# Patient Record
Sex: MALE | Race: WHITE | NOT HISPANIC OR LATINO | URBAN - METROPOLITAN AREA
[De-identification: names, ages, dates, MRNs, and addresses within clinical notes are randomized per-mention and may not be internally consistent; named-entity substitution may affect disease eponyms.]

---

## 2023-01-30 ENCOUNTER — OFFICE (OUTPATIENT)
Dept: URBAN - METROPOLITAN AREA PATHOLOGY 2 | Facility: PATHOLOGY | Age: 73
End: 2023-01-30
Payer: MEDICARE

## 2023-01-30 ENCOUNTER — AMBULATORY SURGICAL CENTER (OUTPATIENT)
Dept: URBAN - METROPOLITAN AREA SURGERY 12 | Facility: SURGERY | Age: 73
End: 2023-01-30
Payer: MEDICARE

## 2023-01-30 VITALS
RESPIRATION RATE: 13 BRPM | OXYGEN SATURATION: 98 % | SYSTOLIC BLOOD PRESSURE: 143 MMHG | HEART RATE: 87 BPM | SYSTOLIC BLOOD PRESSURE: 139 MMHG | HEART RATE: 87 BPM | OXYGEN SATURATION: 100 % | DIASTOLIC BLOOD PRESSURE: 93 MMHG | HEART RATE: 72 BPM | OXYGEN SATURATION: 97 % | DIASTOLIC BLOOD PRESSURE: 86 MMHG | HEART RATE: 74 BPM | RESPIRATION RATE: 13 BRPM | RESPIRATION RATE: 10 BRPM | OXYGEN SATURATION: 99 % | RESPIRATION RATE: 14 BRPM | WEIGHT: 195 LBS | DIASTOLIC BLOOD PRESSURE: 90 MMHG | DIASTOLIC BLOOD PRESSURE: 83 MMHG | SYSTOLIC BLOOD PRESSURE: 152 MMHG | OXYGEN SATURATION: 97 % | SYSTOLIC BLOOD PRESSURE: 143 MMHG | RESPIRATION RATE: 12 BRPM | HEIGHT: 70 IN | HEIGHT: 70 IN | RESPIRATION RATE: 18 BRPM | HEART RATE: 90 BPM | DIASTOLIC BLOOD PRESSURE: 93 MMHG | DIASTOLIC BLOOD PRESSURE: 87 MMHG | RESPIRATION RATE: 16 BRPM | RESPIRATION RATE: 18 BRPM | DIASTOLIC BLOOD PRESSURE: 97 MMHG | OXYGEN SATURATION: 96 % | RESPIRATION RATE: 14 BRPM | HEART RATE: 72 BPM | HEART RATE: 79 BPM | WEIGHT: 195 LBS | DIASTOLIC BLOOD PRESSURE: 86 MMHG | SYSTOLIC BLOOD PRESSURE: 134 MMHG | DIASTOLIC BLOOD PRESSURE: 97 MMHG | OXYGEN SATURATION: 98 % | HEART RATE: 123 BPM | HEART RATE: 73 BPM | DIASTOLIC BLOOD PRESSURE: 87 MMHG | HEART RATE: 90 BPM | OXYGEN SATURATION: 99 % | TEMPERATURE: 97.8 F | SYSTOLIC BLOOD PRESSURE: 134 MMHG | RESPIRATION RATE: 10 BRPM | HEART RATE: 123 BPM | DIASTOLIC BLOOD PRESSURE: 90 MMHG | DIASTOLIC BLOOD PRESSURE: 83 MMHG | HEART RATE: 73 BPM | HEART RATE: 79 BPM | RESPIRATION RATE: 17 BRPM | DIASTOLIC BLOOD PRESSURE: 92 MMHG | SYSTOLIC BLOOD PRESSURE: 152 MMHG | SYSTOLIC BLOOD PRESSURE: 144 MMHG | SYSTOLIC BLOOD PRESSURE: 139 MMHG | SYSTOLIC BLOOD PRESSURE: 155 MMHG | SYSTOLIC BLOOD PRESSURE: 142 MMHG | DIASTOLIC BLOOD PRESSURE: 92 MMHG | SYSTOLIC BLOOD PRESSURE: 144 MMHG | OXYGEN SATURATION: 100 % | SYSTOLIC BLOOD PRESSURE: 142 MMHG | OXYGEN SATURATION: 96 % | TEMPERATURE: 97.8 F | DIASTOLIC BLOOD PRESSURE: 91 MMHG | DIASTOLIC BLOOD PRESSURE: 91 MMHG | SYSTOLIC BLOOD PRESSURE: 155 MMHG | RESPIRATION RATE: 17 BRPM | RESPIRATION RATE: 16 BRPM | HEART RATE: 74 BPM | RESPIRATION RATE: 12 BRPM

## 2023-01-30 DIAGNOSIS — K22.70 BARRETT'S ESOPHAGUS WITHOUT DYSPLASIA: ICD-10-CM

## 2023-01-30 DIAGNOSIS — K22.89 OTHER SPECIFIED DISEASE OF ESOPHAGUS: ICD-10-CM

## 2023-01-30 DIAGNOSIS — R13.10 DYSPHAGIA, UNSPECIFIED: ICD-10-CM

## 2023-01-30 DIAGNOSIS — K31.7 POLYP OF STOMACH AND DUODENUM: ICD-10-CM

## 2023-01-30 DIAGNOSIS — K29.50 UNSPECIFIED CHRONIC GASTRITIS WITHOUT BLEEDING: ICD-10-CM

## 2023-01-30 PROCEDURE — 43250 EGD CAUTERY TUMOR POLYP: CPT | Performed by: INTERNAL MEDICINE

## 2023-01-30 PROCEDURE — 43239 EGD BIOPSY SINGLE/MULTIPLE: CPT | Mod: 59 | Performed by: INTERNAL MEDICINE

## 2023-01-30 PROCEDURE — 88305 TISSUE EXAM BY PATHOLOGIST: CPT | Performed by: PATHOLOGY

## 2023-01-30 PROCEDURE — 88342 IMHCHEM/IMCYTCHM 1ST ANTB: CPT | Performed by: PATHOLOGY

## 2023-01-30 PROCEDURE — 88313 SPECIAL STAINS GROUP 2: CPT | Performed by: PATHOLOGY

## 2023-04-29 LAB — SARS-COV-2 RESULT: NOT DETECTED

## 2023-05-01 ENCOUNTER — HOSPITAL ENCOUNTER (OUTPATIENT)
Dept: DATA CONVERSION | Facility: HOSPITAL | Age: 73
End: 2023-05-02
Attending: OTOLARYNGOLOGY | Admitting: OTOLARYNGOLOGY
Payer: MEDICARE

## 2023-05-01 DIAGNOSIS — R13.10 DYSPHAGIA, UNSPECIFIED: ICD-10-CM

## 2023-05-01 DIAGNOSIS — Z79.82 LONG TERM (CURRENT) USE OF ASPIRIN: ICD-10-CM

## 2023-05-01 DIAGNOSIS — K22.5 DIVERTICULUM OF ESOPHAGUS, ACQUIRED: ICD-10-CM

## 2023-05-01 DIAGNOSIS — I25.10 ATHEROSCLEROTIC HEART DISEASE OF NATIVE CORONARY ARTERY WITHOUT ANGINA PECTORIS: ICD-10-CM

## 2023-05-01 DIAGNOSIS — Z86.73 PERSONAL HISTORY OF TRANSIENT ISCHEMIC ATTACK (TIA), AND CEREBRAL INFARCTION WITHOUT RESIDUAL DEFICITS: ICD-10-CM

## 2023-05-01 DIAGNOSIS — E78.5 HYPERLIPIDEMIA, UNSPECIFIED: ICD-10-CM

## 2023-05-01 DIAGNOSIS — Z88.0 ALLERGY STATUS TO PENICILLIN: ICD-10-CM

## 2023-05-01 DIAGNOSIS — Z95.5 PRESENCE OF CORONARY ANGIOPLASTY IMPLANT AND GRAFT: ICD-10-CM

## 2023-05-01 DIAGNOSIS — I10 ESSENTIAL (PRIMARY) HYPERTENSION: ICD-10-CM

## 2023-09-07 VITALS — BODY MASS INDEX: 28.53 KG/M2 | WEIGHT: 199.3 LBS | HEIGHT: 70 IN

## 2023-09-14 NOTE — DISCHARGE SUMMARY
Send Summary:   Discharge Summary Providers:  Provider Role Provider Name   · Attending Ciro Rossi   · Referring Ciro Rossi   · Primary Unknown, Pcp       Note Recipients: Ciro Rossi MD - [phone]  [Preferred]  Unknown, Pcp, MD       Discharge:    Summary:   Admission Date: .01-May-2023 10:14:00   Discharge Date: 02-May-2023   Attending Physician at Discharge: Ciro Rossi   Admission Reason: Zenker's diverticulum   Final Discharge Diagnoses: Zenker's diverticulum   Procedures: Date: 01-May-2023 13:24:00  Procedure Name: 1. direct laryngoscopy  2. open cricopharyngeal myotomy   Condition at Discharge: Satisfactory   Disposition at Discharge: .Home   Vital Signs:        T   P  R  BP   MAP  SpO2   Value  36  83  18  156/97      95%  Date/Time 5/2 6:11 5/2 6:11 5/2 6:11 5/2 6:11    5/2 6:11  Range  (36C - 36.3C )  (77 - 86 )  (17 - 18 )  (149 - 165 )/ (75 - 97 )    (93% - 95% )    Date:            Weight/Scale Type:  Height:   01-May-2023 20:06  90.4  kg / standing  177.8  cm  Physical Exam:    - General: lying comfortably in bed, conversational  - Head: normocephalic, atraumatic  - Eyes: PERRL, EOMI, sclera anicteric  - Ears: TM pearly white bilaterally  - Nose: no rhinorrhea appreciated, no septal deviation, healthy appearing mucosa  - Mouth: oral mucosa moist, no oral lesions appreciated  - Neck: supple, thyroid not enlarged; incision well approximated, c/d/i  - Lymph: no pre-/post-auricular, submandibular, cervical, nor supraclavicular nodes appreciated  - CV:  regular rate  - Pulm: non-labored breathing on room air  - GI: BS+, soft, non-distended, non-tender to palpation  - Neuro: alert, cranial nerves intact II-XII grossly bilaterally  Hospital Course:    72 yr old male with zenkers diverticulum s/p direct laryngoscopy, open cricopharyngeal myotomy on 5/1/23 with Dr. Rossi. Please see operative report for full details.  Patient tolerated the procedure well and recovered briefly in PACU  before being transitioned to regular nursing floor. Post-op course was uncomplicated. Diet was advanced to soft as tolerated.  IV medication transitioned to oral as diet advanced. Drains  were removed when outputs were appropriately low. On the day of discharge, the pt was tolerating a diet, pain was controlled on PO pain medication, and they were ambulating and voiding spontaneously. They were discharged home in stable condition with  instructions to follow up as outpatient.    Active Issues:  -Dysphagia  Zenker's Diverticulum    Total time spent today was 25 minutes, more than half of my time was spent coordinating patients discharge.       Discharge Information:    and Continuing Care:   Lab Results - Pending:    None  Radiology Results - Pending: None   Discharge Instructions:    Activity:           activity as tolerated.          May shower..            May not return to school/work for 1 week(s).            May not drive while taking narcotics.            No pushing, pulling, or lifting objects greater than 10 pounds.            Weight-bearing Instructions: full weight bearing.      Nutrition/Diet:           regular          Diet Consistency/Texture:   soft    Follow Up Appointments:    Follow-Up Appointment 01:           Physician/Dept/Service:   Dr. Rossi          Reason for Referral:   Post op          Scheduled Date/Time:   17-May-2023 10:30          Location:   [place] [address].          Phone Number:   [phone] with questions    Discharge Medications: Home Medication   metoprolol succinate 25 mg oral tablet, extended release - 1 tab(s) orally once a day  pantoprazole 40 mg oral delayed release tablet - 1 tab(s) orally 2 times a day  magnesium oxide 400 mg oral tablet - 1 tab(s) orally once a day  traMADol 50 mg oral tablet - 1 tab(s) orally every 6 hours -.ADOD - .Patient Location - .Meds to Beds 5/2/23, [facility] 5  as needed for post  operative pain G89.18   Vitamin C 1000 mg oral tablet - 1 tab(s) oral once a day  aspirin 81 mg oral tablet - 1 tab(s) oral once a day  atorvastatin 80 mg oral tablet - 1 tab(s) oral once a day     PRN Medication     DNR Status:   ·  Code Status Code Status order at time of discharge: Full Code     Attestation:   Note Completion:  Provider/Team Pager # [phone]         Electronic Signatures:  Callie Garcia (APRN-CNP)  (Signed 02-May-2023 14:32)   Authored: Send Summary, Summary Content, Ongoing Care,  DNR Status, Note Completion      Last Updated: 02-May-2023 14:32 by Callie Garcia (APRN-CNP)

## 2023-10-02 NOTE — OP NOTE
PROCEDURE DETAILS    Preoperative Diagnosis:  dysphagia  zenkers diverticulum  Postoperative Diagnosis:  dysphagia  zenkers diverticulum  Surgeon: Enid  Resident/Fellow/Other Assistant: masood    Procedure:  1. direct laryngoscopy  2. open cricopharyngeal myotomy  Anesthesia: geta  Estimated Blood Loss: 2cc  Findings: 1. CP bar visualized on dedo, no debris in small pouch  Specimens(s) Collected: no,     Complications: none  Patient Returned To/Condition: pacu/stable                                Attestation:   Note Completion:  Attending Attestation I was present for the entire procedure    I am a: Resident/Fellow         Electronic Signatures:  Charlie Cardoza (Resident))  (Signed 01-May-2023 13:26)   Authored: Post-Operative Note, Chart Review, Note Completion  Ciro Rossi)  (Signed 01-May-2023 17:34)   Authored: Note Completion   Co-Signer: Post-Operative Note, Chart Review, Note Completion      Last Updated: 01-May-2023 17:34 by Ciro Rossi)

## 2023-11-14 NOTE — PROGRESS NOTES
Provider Impressions     Status post surgery for Zenker's diverticulum. The patient is very pleased with the results.  He was made aware of the possibility of a recurrence.  He is to get back to me if things get worse.    I will see him on a as needed basis.     Chief Complaint     Follow-up status post surgery for the management of a Zenker's diverticulum.      History of Present Illness    This patient was seen in February 2023 at the request of his gastroenterologist. He has had some issues with swallowing for the past 4 years or so. He feels that things get blocked in his throat. When he brings food back it tastes like normal food. There is no acid he taste. He has not lost weight. I did send him for an esophagogram which was done in March 2023. It did show a very small Zenker's diverticulum. On May 1, 2023 he underwent a cricopharyngeal myotomy through an external approach. He is here today for follow-up. He is very pleased. His swallowing is significantly improved.     Physical Exam    The neck examination shows the incision site to be well-healed.

## 2023-11-15 ENCOUNTER — OFFICE VISIT (OUTPATIENT)
Dept: OTOLARYNGOLOGY | Facility: CLINIC | Age: 73
End: 2023-11-15
Payer: MEDICARE

## 2023-11-15 VITALS — WEIGHT: 199.6 LBS | BODY MASS INDEX: 28.58 KG/M2 | HEIGHT: 70 IN | TEMPERATURE: 97.3 F

## 2023-11-15 DIAGNOSIS — K22.5 ZENKER'S DIVERTICULUM: Primary | ICD-10-CM

## 2023-11-15 PROCEDURE — 1159F MED LIST DOCD IN RCRD: CPT | Performed by: OTOLARYNGOLOGY

## 2023-11-15 PROCEDURE — 1036F TOBACCO NON-USER: CPT | Performed by: OTOLARYNGOLOGY

## 2023-11-15 PROCEDURE — 99213 OFFICE O/P EST LOW 20 MIN: CPT | Performed by: OTOLARYNGOLOGY

## 2023-11-15 PROCEDURE — 1160F RVW MEDS BY RX/DR IN RCRD: CPT | Performed by: OTOLARYNGOLOGY

## 2023-11-15 PROCEDURE — 3008F BODY MASS INDEX DOCD: CPT | Performed by: OTOLARYNGOLOGY

## 2023-11-15 ASSESSMENT — PATIENT HEALTH QUESTIONNAIRE - PHQ9
2. FEELING DOWN, DEPRESSED OR HOPELESS: NOT AT ALL
1. LITTLE INTEREST OR PLEASURE IN DOING THINGS: NOT AT ALL
SUM OF ALL RESPONSES TO PHQ9 QUESTIONS 1 AND 2: 0

## 2024-04-24 ENCOUNTER — OFFICE (OUTPATIENT)
Dept: URBAN - METROPOLITAN AREA CLINIC 27 | Facility: CLINIC | Age: 74
End: 2024-04-24
Payer: MEDICARE

## 2024-04-24 VITALS
HEIGHT: 70 IN | TEMPERATURE: 98.2 F | SYSTOLIC BLOOD PRESSURE: 137 MMHG | DIASTOLIC BLOOD PRESSURE: 85 MMHG | WEIGHT: 203 LBS | HEART RATE: 68 BPM

## 2024-04-24 DIAGNOSIS — K22.5 DIVERTICULUM OF ESOPHAGUS, ACQUIRED: ICD-10-CM

## 2024-04-24 DIAGNOSIS — K22.70 BARRETT'S ESOPHAGUS WITHOUT DYSPLASIA: ICD-10-CM

## 2024-04-24 DIAGNOSIS — R13.10 DYSPHAGIA, UNSPECIFIED: ICD-10-CM

## 2024-04-24 DIAGNOSIS — R19.4 CHANGE IN BOWEL HABIT: ICD-10-CM

## 2024-04-24 DIAGNOSIS — K21.9 GASTRO-ESOPHAGEAL REFLUX DISEASE WITHOUT ESOPHAGITIS: ICD-10-CM

## 2024-04-24 PROCEDURE — 99214 OFFICE O/P EST MOD 30 MIN: CPT

## 2024-04-26 VITALS
SYSTOLIC BLOOD PRESSURE: 122 MMHG | DIASTOLIC BLOOD PRESSURE: 99 MMHG | RESPIRATION RATE: 10 BRPM | OXYGEN SATURATION: 99 % | SYSTOLIC BLOOD PRESSURE: 118 MMHG | RESPIRATION RATE: 6 BRPM | DIASTOLIC BLOOD PRESSURE: 99 MMHG | DIASTOLIC BLOOD PRESSURE: 108 MMHG | DIASTOLIC BLOOD PRESSURE: 108 MMHG | DIASTOLIC BLOOD PRESSURE: 78 MMHG | RESPIRATION RATE: 12 BRPM | DIASTOLIC BLOOD PRESSURE: 77 MMHG | OXYGEN SATURATION: 99 % | DIASTOLIC BLOOD PRESSURE: 108 MMHG | SYSTOLIC BLOOD PRESSURE: 123 MMHG | RESPIRATION RATE: 20 BRPM | HEART RATE: 71 BPM | WEIGHT: 205 LBS | OXYGEN SATURATION: 95 % | RESPIRATION RATE: 15 BRPM | TEMPERATURE: 97.2 F | DIASTOLIC BLOOD PRESSURE: 79 MMHG | HEART RATE: 71 BPM | HEART RATE: 72 BPM | HEART RATE: 72 BPM | SYSTOLIC BLOOD PRESSURE: 118 MMHG | HEART RATE: 73 BPM | RESPIRATION RATE: 12 BRPM | SYSTOLIC BLOOD PRESSURE: 132 MMHG | DIASTOLIC BLOOD PRESSURE: 79 MMHG | TEMPERATURE: 97.2 F | DIASTOLIC BLOOD PRESSURE: 77 MMHG | DIASTOLIC BLOOD PRESSURE: 77 MMHG | HEART RATE: 64 BPM | DIASTOLIC BLOOD PRESSURE: 99 MMHG | HEART RATE: 80 BPM | HEART RATE: 82 BPM | HEART RATE: 82 BPM | HEART RATE: 62 BPM | RESPIRATION RATE: 12 BRPM | RESPIRATION RATE: 15 BRPM | HEART RATE: 80 BPM | HEIGHT: 70 IN | OXYGEN SATURATION: 84 % | SYSTOLIC BLOOD PRESSURE: 119 MMHG | HEART RATE: 62 BPM | SYSTOLIC BLOOD PRESSURE: 122 MMHG | HEART RATE: 73 BPM | OXYGEN SATURATION: 96 % | HEART RATE: 80 BPM | RESPIRATION RATE: 10 BRPM | HEART RATE: 64 BPM | HEIGHT: 70 IN | SYSTOLIC BLOOD PRESSURE: 122 MMHG | RESPIRATION RATE: 10 BRPM | DIASTOLIC BLOOD PRESSURE: 73 MMHG | HEART RATE: 82 BPM | HEART RATE: 73 BPM | OXYGEN SATURATION: 84 % | DIASTOLIC BLOOD PRESSURE: 73 MMHG | TEMPERATURE: 97.2 F | DIASTOLIC BLOOD PRESSURE: 85 MMHG | DIASTOLIC BLOOD PRESSURE: 85 MMHG | RESPIRATION RATE: 20 BRPM | RESPIRATION RATE: 6 BRPM | RESPIRATION RATE: 15 BRPM | SYSTOLIC BLOOD PRESSURE: 123 MMHG | OXYGEN SATURATION: 95 % | DIASTOLIC BLOOD PRESSURE: 85 MMHG | OXYGEN SATURATION: 95 % | WEIGHT: 205 LBS | SYSTOLIC BLOOD PRESSURE: 119 MMHG | DIASTOLIC BLOOD PRESSURE: 78 MMHG | SYSTOLIC BLOOD PRESSURE: 132 MMHG | HEART RATE: 72 BPM | HEART RATE: 62 BPM | OXYGEN SATURATION: 84 % | RESPIRATION RATE: 6 BRPM | SYSTOLIC BLOOD PRESSURE: 119 MMHG | HEART RATE: 71 BPM | SYSTOLIC BLOOD PRESSURE: 132 MMHG | DIASTOLIC BLOOD PRESSURE: 79 MMHG | HEIGHT: 70 IN | OXYGEN SATURATION: 96 % | OXYGEN SATURATION: 99 % | SYSTOLIC BLOOD PRESSURE: 118 MMHG | DIASTOLIC BLOOD PRESSURE: 73 MMHG | HEART RATE: 64 BPM | OXYGEN SATURATION: 96 % | SYSTOLIC BLOOD PRESSURE: 123 MMHG | WEIGHT: 205 LBS | RESPIRATION RATE: 20 BRPM | DIASTOLIC BLOOD PRESSURE: 78 MMHG

## 2024-05-03 ENCOUNTER — OFFICE (OUTPATIENT)
Dept: URBAN - METROPOLITAN AREA PATHOLOGY 2 | Facility: PATHOLOGY | Age: 74
End: 2024-05-03
Payer: MEDICARE

## 2024-05-03 ENCOUNTER — AMBULATORY SURGICAL CENTER (OUTPATIENT)
Dept: URBAN - METROPOLITAN AREA SURGERY 12 | Facility: SURGERY | Age: 74
End: 2024-05-03
Payer: MEDICARE

## 2024-05-03 ENCOUNTER — AMBULATORY SURGICAL CENTER (OUTPATIENT)
Dept: URBAN - METROPOLITAN AREA SURGERY 12 | Facility: SURGERY | Age: 74
End: 2024-05-03

## 2024-05-03 DIAGNOSIS — K22.89 OTHER SPECIFIED DISEASE OF ESOPHAGUS: ICD-10-CM

## 2024-05-03 DIAGNOSIS — K44.9 DIAPHRAGMATIC HERNIA WITHOUT OBSTRUCTION OR GANGRENE: ICD-10-CM

## 2024-05-03 DIAGNOSIS — K22.70 BARRETT'S ESOPHAGUS WITHOUT DYSPLASIA: ICD-10-CM

## 2024-05-03 DIAGNOSIS — K29.50 UNSPECIFIED CHRONIC GASTRITIS WITHOUT BLEEDING: ICD-10-CM

## 2024-05-03 PROCEDURE — 88313 SPECIAL STAINS GROUP 2: CPT | Mod: TC | Performed by: PATHOLOGY

## 2024-05-03 PROCEDURE — 43239 EGD BIOPSY SINGLE/MULTIPLE: CPT | Performed by: INTERNAL MEDICINE

## 2024-05-03 PROCEDURE — 88342 IMHCHEM/IMCYTCHM 1ST ANTB: CPT | Mod: TC | Performed by: PATHOLOGY

## 2024-05-03 PROCEDURE — 88305 TISSUE EXAM BY PATHOLOGIST: CPT | Mod: TC | Performed by: PATHOLOGY

## 2024-05-06 NOTE — OP NOTE
PREOPERATIVE DIAGNOSIS:  Zenker's diverticulum.    POSTOPERATIVE DIAGNOSIS:  Zenker's diverticulum.    OPERATION/PROCEDURE:  1. Direct laryngoscopy.  2. Cricopharyngeal myotomy.    SURGEON:  Ciro Rossi MD.    ASSISTANT(S):  Dr. Charlie Cardoza.    ANESTHESIA:    INDICATION FOR OPERATIVE PROCEDURE:  This patient presents with a fairly small Zenker's diverticulum.  He  does have some fairly significant issues with swallowing.  He is here  today to undergo a cricopharyngeal myotomy.  It is likely that this  will be done open since the Zenker is very small.  The patient  understands surgery and possible complications, especially in  relation to the proximity of the recurrent laryngeal nerve and wishes  to proceed.     DESCRIPTION OF OPERATION:  Under general anesthesia, the patient was properly positioned.  A  Dedo laryngoscope was introduced.  Careful examination of the oral  cavity, oropharynx, hypopharynx, and larynx was carried out.  Nothing  was identified besides the cricopharyngeal bar.  There was a barely  Zenker's posterior to that bar.  The patient was then prepped and  draped in usual sterile fashion.  A curvilinear incision was outlined  in the left lower neck in the supraclavicular area.  The incision was  made, carried through underlying soft tissue through the platysma.  The anterior jugular vein was clipped and taken.  Dissection between  the sternocleidomastoid muscle and the strap musculature was done.  Then, dissection was carried medial to the carotid sheath to the  prevertebral fascia.  A De [place] bougie had been placed in the  esophagus.  The [place] bougie became palpable.  The esophagus was  identified and a plane was established between the musculature and  the serosa and the muscle was divided on a distance of approximately  3 cm or so.  There was no opening created into the esophagus.  The  wound was irrigated.  Hemostasis was verified.  A 7 mm drain was  placed through a separate  incision and fixed to the skin with 2-0  Prolene.  The wound was closed in 2 planes, subcutaneous plane with  inverting 3-0 Vicryl suture and the skin with a running 5-0 fast.  The estimated blood loss was approximately 5 cc.  The procedure was  terminated.  The patient was sent back to Recovery in fair condition.       Ciro Rossi MD    DD:  05/01/2023 13:05:34 EST  DT:  05/01/2023 13:43:07 EST  DICTATION NUMBER:  [id number]  INTERNAL JOB NUMBER:  [id number]    CC:  Ciro Rossi MD        Electronic Signatures:  Ciro Rossi) (Signed on 01-May-2023 17:37)   Authored  Unsigned, Draft (SYS GENERATED) (Entered on 01-May-2023 13:43)   Entered    Last Updated: 01-May-2023 17:37 by Ciro Rossi)